# Patient Record
Sex: FEMALE | Race: WHITE | Employment: OTHER | URBAN - METROPOLITAN AREA
[De-identification: names, ages, dates, MRNs, and addresses within clinical notes are randomized per-mention and may not be internally consistent; named-entity substitution may affect disease eponyms.]

---

## 2017-04-02 ENCOUNTER — OFFICE VISIT (OUTPATIENT)
Dept: FAMILY MEDICINE CLINIC | Facility: CLINIC | Age: 72
End: 2017-04-02

## 2017-04-02 VITALS
HEART RATE: 65 BPM | OXYGEN SATURATION: 98 % | DIASTOLIC BLOOD PRESSURE: 80 MMHG | TEMPERATURE: 98 F | RESPIRATION RATE: 14 BRPM | SYSTOLIC BLOOD PRESSURE: 166 MMHG

## 2017-04-02 DIAGNOSIS — I10 ELEVATED BLOOD PRESSURE READING WITH DIAGNOSIS OF HYPERTENSION: ICD-10-CM

## 2017-04-02 DIAGNOSIS — M62.830 MUSCLE SPASM OF BACK: ICD-10-CM

## 2017-04-02 DIAGNOSIS — M54.50 ACUTE LEFT-SIDED LOW BACK PAIN WITHOUT SCIATICA: Primary | ICD-10-CM

## 2017-04-02 PROCEDURE — 99202 OFFICE O/P NEW SF 15 MIN: CPT | Performed by: PHYSICIAN ASSISTANT

## 2017-04-02 RX ORDER — METHOCARBAMOL 750 MG/1
TABLET, FILM COATED ORAL
Qty: 30 TABLET | Refills: 0 | OUTPATIENT
Start: 2017-04-02 | End: 2018-10-31 | Stop reason: ALTCHOICE

## 2017-04-02 RX ORDER — CYCLOBENZAPRINE HCL 10 MG
10 TABLET ORAL 3 TIMES DAILY PRN
Qty: 30 TABLET | Refills: 0 | Status: SHIPPED | OUTPATIENT
Start: 2017-04-02 | End: 2018-10-31 | Stop reason: ALTCHOICE

## 2017-04-02 RX ORDER — METHOCARBAMOL 750 MG/1
TABLET, FILM COATED ORAL
Qty: 30 TABLET | Refills: 0 | Status: SHIPPED | OUTPATIENT
Start: 2017-04-02 | End: 2017-04-02

## 2017-04-02 NOTE — PATIENT INSTRUCTIONS
Back Pain (Acute or Chronic)    Back pain is one of the most common problems. The good news is that most people feel better in 1 to 2 weeks, and most of the rest in 1 to 2 months. Most people can remain active.   People experience and describe pain differ Unless you had a physical injury (for example, a car accident or fall) X-rays are usually not needed for the initial evaluation of back pain. If pain continues and does not respond to medical treatment, X-rays and other tests may be needed.   Home care  Try Talk to your doctor before using medicine, especially if you have other medical problems or are taking other medicines. · You may use over-the-counter medicine as directed on the bottle to control pain, unless another pain medicine was prescribed.  If you A muscle spasm is a sudden tightening of the muscle you can’t control. This may be caused by strain, overworking the muscle, or injury. It can also be caused by dehydration, electrolyte imbalance, diabetes, alcohol use, and certain medicines.  If it goes on

## 2017-04-02 NOTE — PROGRESS NOTES
CHIEF COMPLAINT:     Patient presents with:  Low Back Pain: acute on chronic, slept funny, req muscle relaxer      HPI:   Karin Alicea is a 70year old female who is here for complaints of left sided low back pain. Pain is described as cramping.  Severi GENERAL: well developed, well nourished,in no apparent distress  SKIN: no rashes,no suspicious lesions  NECK: supple  LUNGS: clear to auscultation  CARDIO: RRR   BACK:No deformity or bruising noted. No pain with palpation.  Negative SLR DTR's are 2+ bilater · The pain can be sharp, stabbing, shooting, aching, cramping or burning. · Movement, standing, bending, lifting, sitting, or walking may worsen pain. · It can be localized to one spot or area, or it can be more generalized.   · It can spread or radiate u · When in bed, try to find a position of comfort. A firm mattress is best. Try lying flat on your back with pillows under your knees. You can also try lying on your side with your knees bent up towards your chest and a pillow between your knees.   · At [facility] · You may use over-the-counter medicine as directed on the bottle to control pain, unless another pain medicine was prescribed.  If you have chronic conditions like diabetes, liver or kidney disease, stomach ulcers, or gastrointestinal bleeding, or are taki · When the spasm is in your arm or leg, stretch the muscle passively.  To do this, have someone bend or straighten the joint above or below the muscle until you feel the stretch on the sore muscle. You can stretch the muscle actively by moving the affected

## 2017-05-16 ENCOUNTER — OFFICE VISIT (OUTPATIENT)
Dept: FAMILY MEDICINE CLINIC | Facility: CLINIC | Age: 72
End: 2017-05-16

## 2017-05-16 VITALS
HEIGHT: 64 IN | DIASTOLIC BLOOD PRESSURE: 88 MMHG | SYSTOLIC BLOOD PRESSURE: 172 MMHG | WEIGHT: 172 LBS | TEMPERATURE: 98 F | RESPIRATION RATE: 16 BRPM | OXYGEN SATURATION: 98 % | BODY MASS INDEX: 29.37 KG/M2 | HEART RATE: 70 BPM

## 2017-05-16 DIAGNOSIS — M67.441 GANGLION CYST OF FINGER OF RIGHT HAND: Primary | ICD-10-CM

## 2017-05-16 PROCEDURE — 99213 OFFICE O/P EST LOW 20 MIN: CPT | Performed by: PHYSICIAN ASSISTANT

## 2017-05-16 NOTE — PATIENT INSTRUCTIONS
Ganglion Cyst    A ganglion cyst usually is a painless bump on the wrist or finger joint. It connects to the joint capsule and grows like a balloon on a stalk. It is filled with joint fluid.  The cause of a ganglion cyst is not known.   If the cyst puts p © 3061-9940 The [address] [place] [address], [address]. All rights reserved. This information is not intended as a substitute for professional medical care. Always follow your healthcare professional's instructions.         Zoey Your healthcare provider may just watch your ganglion cyst. Many shrink and become painless without treatment. Some disappear altogether.  If the cyst is unsightly or painful, or makes it hard for you to use your hand, your healthcare provider can treat it

## 2017-05-16 NOTE — PROGRESS NOTES
CHIEF COMPLAINT:   Patient presents with:  Blisters: tried to pop blister on finger, now appears infected      HPI:     Maria Esther Foster is a 67year old female who presents with concerns of possible infected blister on L right index finger.  Patient first /88 mmHg  Pulse 70  Temp(Src) 98 °F (36.7 °C)  Resp 16  Ht 64\"  Wt 172 lb  BMI 29.51 kg/m2  SpO2 98%  GENERAL: well developed, well nourished,in no apparent distress  HEAD: atraumatic, normocephalic  EXTREMITIES: no cyanosis  Cap refill brisk- less If the cyst puts pressure on a nearby nerve it may cause pain. Otherwise, cysts are painless and harmless. Most tend to disappear over time without treatment. Do not try to drain or break the cyst at home. This can cause harm and does not cure the problem. A ganglion cyst is a firm, fluid-filled lump that can suddenly appear on the front or back of the wrist or at the base of a finger. These cysts grow from normal tissue in the wrist and fingers, and range in size from a pea to a peach pit.  Although ganglion To shrink the cyst, your provider may remove (aspirate) the fluid with a needle. If the cyst hurts, your provider may also give you an injection of an anti-inflammatory, such as cortisone, to relieve the irritation.  Your hand may then be wrapped to help ke

## 2018-01-14 ENCOUNTER — OFFICE VISIT (OUTPATIENT)
Dept: FAMILY MEDICINE CLINIC | Facility: CLINIC | Age: 73
End: 2018-01-14

## 2018-01-14 VITALS
BODY MASS INDEX: 30 KG/M2 | WEIGHT: 173 LBS | HEART RATE: 84 BPM | DIASTOLIC BLOOD PRESSURE: 88 MMHG | TEMPERATURE: 99 F | OXYGEN SATURATION: 98 % | SYSTOLIC BLOOD PRESSURE: 166 MMHG

## 2018-01-14 DIAGNOSIS — J06.9 URI WITH COUGH AND CONGESTION: Primary | ICD-10-CM

## 2018-01-14 PROCEDURE — 99213 OFFICE O/P EST LOW 20 MIN: CPT | Performed by: NURSE PRACTITIONER

## 2018-01-14 RX ORDER — AZITHROMYCIN 250 MG/1
TABLET, FILM COATED ORAL
Qty: 6 TABLET | Refills: 0 | Status: SHIPPED | OUTPATIENT
Start: 2018-01-14 | End: 2018-10-31 | Stop reason: ALTCHOICE

## 2018-01-14 NOTE — PROGRESS NOTES
CHIEF COMPLAINT:   Patient presents with:  Cough: x4 days      HPI:   Aniket Malcolm is a 67year old female who presents for upper respiratory symptoms for  4 days. Patient reports sore throat, congestion, headache, ear pain and chills.  Symptoms have appetite  SKIN: no rashes or abnormal skin lesions  HEENT: See HPI  LUNGS: denies shortness of breath or wheezing, See HPI  CARDIOVASCULAR: denies chest pain or palpitations   GI: denies N/V/C or abdominal pain  NEURO: Denies headaches    EXAM:   BP (!) 16 benefits, and side effects of medication explained and discussed.     Patient Instructions   -continue robitussin and claritin  -take mucinex in the morning (plain)  -start z-josh Thursday if no improvement          The patient indicates understanding of the

## 2018-01-14 NOTE — PATIENT INSTRUCTIONS
-continue robitussin and claritin  -take mucinex in the morning (plain)  -start z-josh Thursday if no improvement

## 2018-10-31 PROBLEM — F41.9 ANXIETY: Status: ACTIVE | Noted: 2018-10-31

## 2018-10-31 PROBLEM — I10 ESSENTIAL HYPERTENSION: Status: ACTIVE | Noted: 2018-10-31

## 2018-11-14 PROCEDURE — 84165 PROTEIN E-PHORESIS SERUM: CPT | Performed by: INTERNAL MEDICINE

## 2018-11-14 PROCEDURE — 86334 IMMUNOFIX E-PHORESIS SERUM: CPT | Performed by: INTERNAL MEDICINE

## 2018-11-14 PROCEDURE — 83883 ASSAY NEPHELOMETRY NOT SPEC: CPT | Performed by: INTERNAL MEDICINE

## 2023-05-20 ENCOUNTER — HOSPITAL ENCOUNTER (OUTPATIENT)
Age: 78
Discharge: HOME | End: 2023-05-20
Payer: MEDICARE

## 2023-05-20 VITALS
DIASTOLIC BLOOD PRESSURE: 80 MMHG | RESPIRATION RATE: 20 BRPM | HEART RATE: 65 BPM | TEMPERATURE: 98 F | OXYGEN SATURATION: 98 % | SYSTOLIC BLOOD PRESSURE: 193 MMHG

## 2023-05-20 DIAGNOSIS — J01.90 ACUTE SINUSITIS, RECURRENCE NOT SPECIFIED, UNSPECIFIED LOCATION: Primary | ICD-10-CM

## 2023-05-20 PROCEDURE — 99213 OFFICE O/P EST LOW 20 MIN: CPT

## 2023-05-20 RX ORDER — FLUCONAZOLE 150 MG/1
150 TABLET ORAL ONCE
Qty: 1 TABLET | Refills: 0 | Status: SHIPPED | OUTPATIENT
Start: 2023-05-20 | End: 2023-05-20

## 2023-05-20 RX ORDER — PREDNISONE 20 MG/1
60 TABLET ORAL DAILY
Qty: 15 TABLET | Refills: 0 | Status: SHIPPED | OUTPATIENT
Start: 2023-05-20 | End: 2023-05-25

## 2023-05-20 RX ORDER — AZITHROMYCIN 250 MG/1
TABLET, FILM COATED ORAL
Qty: 6 TABLET | Refills: 0 | Status: SHIPPED | OUTPATIENT
Start: 2023-05-20 | End: 2023-05-25

## 2023-05-20 NOTE — ED INITIAL ASSESSMENT (HPI)
Patient reports hx of chronic post nasal drip. States she has been gardening recently and noted increased post nasal drip since Wednesday accompanied by chest congestion. Taking otc medication without resolution of symptoms.

## 2023-05-20 NOTE — DISCHARGE INSTRUCTIONS
As discussed, we will be treating you for sinus infection. Please begin steroids today. Watch and wait antibiotics. I would like you to take steroids for 2 days. If no improvement of symptoms, you may begin Z-Benson and follow directions as prescribed. Take fluconazole at your request for prevention of yeast infection after full completion of antibiotics. Please drink plenty of water and stay well-hydrated. Sleep somewhat elevated and upright to help avoid congestion, postnasal drip, cough reflex, sore throat. Sleep with humidifier. You may continue all over-the-counter cold medication.   If you have any new or worsening symptoms, chest pain, dizziness, lightheadedness, palpitations, shortness of breath

## 2025-07-21 ENCOUNTER — APPOINTMENT (OUTPATIENT)
Dept: GENERAL RADIOLOGY | Age: 80
End: 2025-07-21
Attending: STUDENT IN AN ORGANIZED HEALTH CARE EDUCATION/TRAINING PROGRAM
Payer: MEDICARE

## 2025-07-21 ENCOUNTER — HOSPITAL ENCOUNTER (OUTPATIENT)
Age: 80
Discharge: HOME | End: 2025-07-21
Attending: STUDENT IN AN ORGANIZED HEALTH CARE EDUCATION/TRAINING PROGRAM
Payer: MEDICARE

## 2025-07-21 VITALS
DIASTOLIC BLOOD PRESSURE: 72 MMHG | TEMPERATURE: 98 F | SYSTOLIC BLOOD PRESSURE: 189 MMHG | RESPIRATION RATE: 18 BRPM | HEART RATE: 79 BPM | OXYGEN SATURATION: 96 %

## 2025-07-21 DIAGNOSIS — R03.0 ELEVATED BLOOD PRESSURE READING: ICD-10-CM

## 2025-07-21 DIAGNOSIS — J40 BRONCHITIS WITH WHEEZING: Primary | ICD-10-CM

## 2025-07-21 LAB — SARS-COV-2 RNA RESP QL NAA+PROBE: NOT DETECTED

## 2025-07-21 PROCEDURE — 94640 AIRWAY INHALATION TREATMENT: CPT

## 2025-07-21 PROCEDURE — 99215 OFFICE O/P EST HI 40 MIN: CPT

## 2025-07-21 PROCEDURE — 99214 OFFICE O/P EST MOD 30 MIN: CPT

## 2025-07-21 PROCEDURE — 71046 X-RAY EXAM CHEST 2 VIEWS: CPT | Performed by: STUDENT IN AN ORGANIZED HEALTH CARE EDUCATION/TRAINING PROGRAM

## 2025-07-21 RX ORDER — IPRATROPIUM BROMIDE AND ALBUTEROL SULFATE 2.5; .5 MG/3ML; MG/3ML
3 SOLUTION RESPIRATORY (INHALATION) ONCE
Status: COMPLETED | OUTPATIENT
Start: 2025-07-21 | End: 2025-07-21

## 2025-07-21 RX ORDER — PREDNISONE 20 MG/1
40 TABLET ORAL DAILY
Qty: 8 TABLET | Refills: 0 | Status: SHIPPED | OUTPATIENT
Start: 2025-07-21 | End: 2025-07-25

## 2025-07-21 RX ORDER — ALBUTEROL SULFATE 90 UG/1
2 INHALANT RESPIRATORY (INHALATION) EVERY 6 HOURS PRN
Qty: 1 EACH | Refills: 0 | Status: SHIPPED | OUTPATIENT
Start: 2025-07-21 | End: 2025-08-20

## 2025-07-21 RX ORDER — ALBUTEROL SULFATE 0.83 MG/ML
2.5 SOLUTION RESPIRATORY (INHALATION) ONCE
Status: COMPLETED | OUTPATIENT
Start: 2025-07-21 | End: 2025-07-21

## 2025-07-21 NOTE — DISCHARGE INSTRUCTIONS
Your chest x-ray shows no signs of pneumonia, it does show the chronic lung lesion for which you have already followed up with your primary care physician.  Your exam is consistent with airway hyperreactivity/tightening/narrowing in reaction to an underlying virus for which I have prescribed an albuterol inhaler, your next dose is 6 hours after the dose administered in the immediate care.  You also have likely inflammation of the airway called bronchitis for which I have prescribed oral prednisone a steroid.  Currently, no signs of bacterial infection, viral infections can make you susceptible to secondary bacterial infections, therefore with any new, changing, or progressing signs or symptoms, you should be immediately reassessed.    Airway hyperreactivity can rapidly progress to become life-threatening, and therefore with any shortness of breath or difficulty breathing, I recommend immediate assessment through the emergency department.    At this time your exam and evaluation are consistent with a viral infection. Viral infections do not respond to antibiotics and are treated symptomatically. Ensure to rest and maintain hydration. Viral infections can progress and can lead to bacterial infections. If you develop any new, progressing, changing, or worsening signs or symptoms, please present immediately to your primary care physician for reassessment. If you develop any difficulty breathing, chest pain, shortness of breath, difficulty swallowing, drooling, signs or symptoms of dehydration, or any other concerning symptoms, call 911 or present immediately to the emergency department.

## 2025-07-21 NOTE — ED PROVIDER NOTES
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  She states that she gets palpitations from anxiety, but no cardiac dysrhythmia history, and per chart review of her primary care physician note it appears metoprolol is for both HTN and anxiety, she also reports whitecoat hypertension, blood pressure has been higher on previous assessments/note with systolic in the 190s and 200s per chart review of previous assessments, she states it usually normalizes throughout the visit.    Objective:     Past Medical History:    Hyperlipidemia              History reviewed. No pertinent surgical history.             Social History     Socioeconomic History    Marital status:    Tobacco Use    Smoking status: Former     Current packs/day: 0.00     Types: Cigarettes     Quit date: 1991     Years since quittin.8    Smokeless tobacco: Never   Substance and Sexual Activity    Alcohol use: No              Review of Systems    Positive for stated complaint: Cough  Other systems are as noted in HPI.  Constitutional and vital signs reviewed.      All other systems reviewed and negative except as noted above.                  Physical Exam    ED Triage Vitals [25 1230]   BP (!) 189/72   Pulse 69   Resp 18   Temp 97.9 °F (36.6 °C)   Temp src Oral   SpO2 96 %   O2 Device None (Room air)       Current Vitals:   Vital Signs  BP: (!) 189/72  Pulse: 79  Resp: 18  Temp: 97.9  °F (36.6 °C)  Temp src: Oral    Oxygen Therapy  SpO2: 96 %  O2 Device: None (Room air)            Physical Exam    General: Awake, alert, comfortable on room air, in no distress, tolerating oral secretions, interactive  Pulmonary: There is mild scattered B/L weehzing, no conversational dyspnea  Neuro: Symmetrical facial expressions on gross observation  HEENT: No periorbital edema or erythema, nonerythematous and nonedematous intact B/L TMs, no erythema or edema of the B/L ear canals, nasal congestion is present, nonerythematous and nonedematous B/L tonsils, no tonsillar exudates, no peritonsillar edema, uvula midline, tolerating oral secretions, normal speech, no submandibular edema  Neck: No anterior or posterior cervical lymphadenopathy  Psych: Normal mood, normal affect        ED Course  Labs Reviewed   RAPID SARS-COV-2 BY PCR - Normal     Copy of radiology report of patient's chest x-ray:  Narrative  PROCEDURE: XR CHEST PA + LAT CHEST (CPT=71046)    INDICATIONS:  wheezing B/L lungs, greatest in the B/L upper lungs, with cough    COMPARISON: None    TECHNIQUE: 2 views, frontal and lateral.    FINDINGS:  CARDIAC/MEDIASTINUM: Borderline cardiomegaly.Tortuous thoracic aorta.    LUNGS/PLEURA: Mild linear opacity in the left mid and lower lung probably scarring or atelectasis. There is an ovoid 3 cm opacity seen in the retrosternal region on the lateral view. No pleural effusion or pneumothorax.    BONES: Degenerative changes noted.    OTHER: Negative.   Impression  CONCLUSION:  Nonspecific 3 cm ovoid peripheral opacity in the retrosternal region on the lateral view. Recommend further evaluation with chest CT which can be performed non emergently unless otherwise clinically warranted. Otherwise no acute cardiopulmonary  abnormality.    Electronically Verified and Signed by Attending Radiologist: Addison Crowder MD 7/21/2025 2:13 PM  Workstation: [id number]        Exam Ended: 07/21/25 13:20 Last Resulted: 07/21/25  14:13       [facility]  Patient is awake, alert, comfortable on room air, in no distress, afebrile, she has scattered areas of B/L wheezing but no distress, consistent with acute bronchospasm, likely in the setting of underlying viral infection but also consider potential pneumonia and will assess with chest x-ray, patient is agreeable to this plan, she notes there are chronic findings on chest x-ray that her primary care physician is aware of that have been reported on previous x-rays, no sign of otitis media or otitis externa, no sign of tonsillitis or PTA or deep space infection, patient is agreeable to treatment with nebulized albuterol as well as COVID testing will need to be performed given likely underlying infection prior to nebulizer treatment  - Patient's COVID test resulted as negative  - Per my personal review and interpretation of the patient's chest x-ray there is a retrosternal opacity, but otherwise no consolidations, no effusions, no pneumothoraces.  This is consistent with my review of the radiology report as copied above with a nonspecific 3 cm ovoid peripheral opacity in the retrosternal region recommended further evaluation with non-emergent CT imaging unless otherwise warranted.  Per chart review via [facility], this finding was present on chest x-ray from 6/25/2015 radiology report, but imaging is not available for direct visualized comparison. I also called and discussed with the radiologist, Dr. Crowder, who confirms that there is no pneumonia on chest x-ray.  This was all discussed with the patient, she states her primary care physician is already aware of and has already assessed this chronic finding on imaging.  -1411 p.m., patient reassessed, comfortable on room air, saturating 96% on room air with good waveform, HR in the 70s, feels mildly improved, patient had minimal B/L wheezing initially, now with diffuse wheezing, suspect improvement in bronchospasm with narrowed airways on initial  assessment that have begun to open and therefore more wheezing appreciate at this time following initial treatment, will treat with an additional DuoNeb, no palpitations, comfortable on room air, patient is agreeable to this plan  -Patient reassessed after second treatment with DuoNeb, reports feeling improved, wheezing has significantly improved and is now only scattered, she now has good airflow throughout, she feels comfortable with discharge home with prednisone for bronchitis/airway inflammation and albuterol inhaler for airway hyperreactivity, not to take another dose of albuterol until it has been 6 hours, chamber for ease of usage also prescribed.  No previous history of diabetes. She is also already taking over the counter robitussin for symptomatic relief.  - Reports blood pressure elevation is chronic due to whitecoat hypertension, to monitor closely at home and follow-up with her primary care physician.  - We discussed that viral infections can make her susceptible to secondary bacterial infections, and therefore with any new, changing, or progressing signs or symptoms, to be immediately reassessed.  - Currently, no signs of respiratory distress, but ED precautions discussed      Medical Decision Making  Amount and/or Complexity of Data Reviewed  External Data Reviewed: radiology.     Details: Radiology report from 6/25/15 reviewed, notes with previous blood pressure readings reviewed  Labs: ordered.  Radiology: ordered and independent interpretation performed.    Risk  OTC drugs.  Prescription drug management.        Disposition and Plan     Clinical Impression:  1. Bronchitis with wheezing    2. Elevated blood pressure reading         Disposition:  Discharge  7/21/2025  2:50 pm    Follow-up:  Veda Wyatt MD  [address] [facility]  [address] [place] [address]  [phone]    In 3 days            Medications Prescribed:  Discharge Medication List as of 7/21/2025  2:51 PM        START taking  these medications    Details   albuterol 108 (90 Base) MCG/ACT Inhalation Aero Soln Inhale 2 puffs into the lungs every 6 (six) hours as needed for Wheezing or Shortness of Breath., Normal, Disp-1 each, R-0      Spacer/Aero-Holding Chambers Does not apply Device To use in association with the albuterol inhaler to enhance delivery of medication., Normal, Disp-1 each, R-0Please educate on usage      predniSONE 20 MG Oral Tab Take 2 tablets (40 mg total) by mouth daily for 4 days., Normal, Disp-8 tablet, R-0

## 2025-07-21 NOTE — ED INITIAL ASSESSMENT (HPI)
Patient arrived ambulatory to room c/o symptoms that started 5 days ago. +productive cough. +nasal congestion. No fevers. No n/v/d. Easy non labored respirations. Cough becomes more coarse at night.

## (undated) NOTE — MR AVS SNAPSHOT
[address] [place] [address] [facility]  [phone]               Thank you for choosing us for your health care visit with July Napier PA-C. We are glad to serve you and happy to provide you with this summary of your visit.   P time. You can buy one at many drug stores without a prescription. · You may use over-the-counter pain medicine to control pain, unless another medicine was prescribed.  If you have chronic liver or kidney disease or ever had a stomach ulcer or GI bleeding, more. Some hand and wrist movements, such as grasping things, may also be difficult. How a ganglion cyst develops  Your wrist and hand are made up of many small bones that meet at joints. Tendons attach muscles to the bones at the joints.  The tendons allo surgery. Your hand may be in a splint for several days. Date Last Reviewed: 9/10/2015  © 2397-9597 [address], [address] [place] [address]. All rights reserved.  This information is not intended as a substitute for professional Lifestyle Modification Recommendations:    Modification Recommendation   Weight Reduction Maintain normal body weight (body mass index 18.5-24.9 kg/m2)   DASH eating plan Adopt a diet rich in fruits, vegetables, and low fat dairy products with reduced cont

## (undated) NOTE — MR AVS SNAPSHOT
[address] [place] [address]  [phone]               Thank you for choosing us for your health care visit with Jeanne Swartz PA-C. We are glad to serve you and happy to provide you with this summary of your visit.   P · Overexertion, lifting, pushing, pulling incorrectly or too aggressively  · Sudden twisting, bending, or stretching from an accident, or accidental movement  · Poor posture  · Stretching or moving wrong, without noticing pain at the time  · Poor coordinat · You can start with ice, then switch to heat. Heat (hot shower, hot bath, or heating pad) reduces pain and works well for muscle spasms. Heat can be applied to the painful area for 20 minutes then remove it for 20 minutes.  Do this over a period of 60 to 9 · Numbness in the groin or genital area  Date Last Reviewed: 7/1/2016  © 5689-5826 [facility] [address] [place] [address], [address] [place] [address]. All rights reserved. This information is not intended as a substitute for professional medical care.  [place] [address]. All rights reserved. This information is not intended as a substitute for professional medical care. Always follow your healthcare professional's instructions.              Allergies as of Apr 02, 2017     Bicillin L-A     Epinephrine     Increase he Modification Recommendation   Weight Reduction Maintain normal body weight (body mass index 18.5-24.9 kg/m2)   DASH eating plan Adopt a diet rich in fruits, vegetables, and low fat dairy products with reduced content of saturated and total fat.    Dietary s Don’t forget strength training with weights and resistance Set goals and track your progress   You don’t need to join a gym. Home exercises work great.  Put more priority on exercise in your life                    Visit [facility] online at